# Patient Record
(demographics unavailable — no encounter records)

---

## 2025-04-03 NOTE — HISTORY OF PRESENT ILLNESS
[FreeTextEntry1] : 2025 -- Pt is a 56 y/o F  with hx OAB, currently on Mirabegron 25mg. Here today for annual f/u- She reports decreased efficacy w Mirabegron.  She expressed interest in Gemtesa, although only if covered by her insurance.  Otherwise, she has had no recent UTIs.  She also denies any fever, gross hematuria, or dysuria.   2024 -- Pt is 53 yo F  with hx OAB, currently on Mirabegron 25mg. Here today for annual f/u- she presents well managed with Mirabegron 25mg with no side effects. She has not had any recent UTIs. Nocturia 1-2 and it is unclear if the bladder is waking her or if this is a sleep disturbance- pt minimally bothered.  She started HRT (progesterone PO and patch) "couple years ago". No issues.   Interval hx:  PSH uterine polypectomy in 2024, pt initially had vag bleeding and was evaluated- now resolved as per pt.   PVR: 0cc (done to rule out incomplete bladder emptying) Udip: 2024 --- negative    3/15/23-- Pt is a 52 year old female  with a history of overactive bladder, currently on 25mg Mirabegron.  This controls her symptoms very well and she has no side effects.  She returns today for her annual follow-up to assess her progress. She states that she is doing well and just recently returned from [place].   During the pandemic from November- February she was without Myrbetriq due to her insurance and her symptoms returned.   PVR to rule out incomplete emptying was performed- PVR nil   PMH/PSH neg former tob, social EtOH FH:  neg  malig, neg stones

## 2025-04-03 NOTE — ASSESSMENT
[FreeTextEntry1] : I discussed the findings and options with Ms. EMMANUEL CORRAL in detail. We discussed the side effects of beta 3-agonsits, such as Gemtesa and Mirabergon.   Ms. CORRAL reports a decreased efficacy after taking Myrbertiq 25 mg. At this point, the patient will investigate if Gemtesa is covered by her insurance; until then, the patient will continue Mybertiq 25 mg. I also advised patient to avoid bladder irritants and fluid intake 3 hours prior to bed to improve her nighttime symptoms.     Until then, the pt will plan to return in 1 year for annual f/u, or sooner if needed.  Patient expressed understanding.   The total amount of time I have personally spent preparing for this visit, reviewing the patient's test results, obtaining external history, ordering tests/medications, documenting clinical information, communicating with and counseling the patient/family and/or caregiver(s), reviewing old records, and spent face to face with the patient explaining the above was 35 minutes.

## 2025-04-03 NOTE — HISTORY OF PRESENT ILLNESS
[FreeTextEntry1] : 2025 -- Pt is a 54 y/o F  with hx OAB, currently on Mirabegron 25mg. Here today for annual f/u- She reports decreased efficacy w Mirabegron.  She expressed interest in Gemtesa, although only if covered by her insurance.  Otherwise, she has had no recent UTIs.  She also denies any fever, gross hematuria, or dysuria.   2024 -- Pt is 51 yo F  with hx OAB, currently on Mirabegron 25mg. Here today for annual f/u- she presents well managed with Mirabegron 25mg with no side effects. She has not had any recent UTIs. Nocturia 1-2 and it is unclear if the bladder is waking her or if this is a sleep disturbance- pt minimally bothered.  She started HRT (progesterone PO and patch) "couple years ago". No issues.   Interval hx:  PSH uterine polypectomy in 2024, pt initially had vag bleeding and was evaluated- now resolved as per pt.   PVR: 0cc (done to rule out incomplete bladder emptying) Udip: 2024 --- negative    3/15/23-- Pt is a 52 year old female  with a history of overactive bladder, currently on 25mg Mirabegron.  This controls her symptoms very well and she has no side effects.  She returns today for her annual follow-up to assess her progress. She states that she is doing well and just recently returned from [place].   During the pandemic from November- February she was without Myrbetriq due to her insurance and her symptoms returned.   PVR to rule out incomplete emptying was performed- PVR nil   PMH/PSH neg former tob, social EtOH FH:  neg  malig, neg stones

## 2025-04-03 NOTE — LETTER BODY
[Dear  ___] : Dear  [unfilled], [Consult Letter:] : I had the pleasure of evaluating your patient, [unfilled]. [Please see my note below.] : Please see my note below. [Consult Closing:] : Thank you very much for allowing me to participate in the care of this patient.  If you have any questions, please do not hesitate to contact me. [Sincerely,] : Sincerely, [FreeTextEntry3] : Dr. Jessy Puentes

## 2025-04-03 NOTE — ADDENDUM
[FreeTextEntry1] : A portion of this note was written by [Ismael Miles] on 03/26/2024 acting as a scribe for Dr. Puentes.   I have personally reviewed the chart and agree that the record accurately reflects my personal performance of the history, physical exam, assessment, and plan.